# Patient Record
Sex: MALE | URBAN - METROPOLITAN AREA
[De-identification: names, ages, dates, MRNs, and addresses within clinical notes are randomized per-mention and may not be internally consistent; named-entity substitution may affect disease eponyms.]

---

## 2023-07-17 ENCOUNTER — OUT OF OFFICE VISIT (OUTPATIENT)
Dept: URBAN - METROPOLITAN AREA MEDICAL CENTER 27 | Facility: MEDICAL CENTER | Age: 78
End: 2023-07-17
Payer: MEDICARE

## 2023-07-17 DIAGNOSIS — K31.89 ACQUIRED DEFORMITY OF DUODENUM: ICD-10-CM

## 2023-07-17 DIAGNOSIS — Z79.02 ANTIPLATELET OR ANTITHROMBOTIC LONG-TERM USE: ICD-10-CM

## 2023-07-17 DIAGNOSIS — R10.13 ABDOMINAL DISCOMFORT, EPIGASTRIC: ICD-10-CM

## 2023-07-17 PROCEDURE — 99221 1ST HOSP IP/OBS SF/LOW 40: CPT | Performed by: STUDENT IN AN ORGANIZED HEALTH CARE EDUCATION/TRAINING PROGRAM

## 2023-07-17 PROCEDURE — G8427 DOCREV CUR MEDS BY ELIG CLIN: HCPCS | Performed by: STUDENT IN AN ORGANIZED HEALTH CARE EDUCATION/TRAINING PROGRAM

## 2023-07-18 ENCOUNTER — TELEPHONE ENCOUNTER (OUTPATIENT)
Dept: URBAN - METROPOLITAN AREA CLINIC 6 | Facility: CLINIC | Age: 78
End: 2023-07-18

## 2023-08-02 ENCOUNTER — LAB OUTSIDE AN ENCOUNTER (OUTPATIENT)
Dept: URBAN - METROPOLITAN AREA CLINIC 12 | Facility: CLINIC | Age: 78
End: 2023-08-02

## 2023-08-02 ENCOUNTER — DASHBOARD ENCOUNTERS (OUTPATIENT)
Age: 78
End: 2023-08-02

## 2023-08-02 ENCOUNTER — OFFICE VISIT (OUTPATIENT)
Dept: URBAN - METROPOLITAN AREA CLINIC 12 | Facility: CLINIC | Age: 78
End: 2023-08-02
Payer: MEDICARE

## 2023-08-02 VITALS
HEIGHT: 73 IN | HEART RATE: 95 BPM | BODY MASS INDEX: 34.59 KG/M2 | DIASTOLIC BLOOD PRESSURE: 59 MMHG | WEIGHT: 261 LBS | SYSTOLIC BLOOD PRESSURE: 98 MMHG

## 2023-08-02 DIAGNOSIS — Z79.02 LONG TERM (CURRENT) USE OF ANTITHROMBOTICS/ANTIPLATELETS: ICD-10-CM

## 2023-08-02 DIAGNOSIS — R93.5 ABNORMAL CT OF THE ABDOMEN: ICD-10-CM

## 2023-08-02 DIAGNOSIS — R10.13 EPIGASTRIC PAIN: ICD-10-CM

## 2023-08-02 DIAGNOSIS — R63.4 WEIGHT LOSS: ICD-10-CM

## 2023-08-02 PROBLEM — 305058001: Status: ACTIVE | Noted: 2023-08-02

## 2023-08-02 PROCEDURE — 99204 OFFICE O/P NEW MOD 45 MIN: CPT | Performed by: STUDENT IN AN ORGANIZED HEALTH CARE EDUCATION/TRAINING PROGRAM

## 2023-08-02 PROCEDURE — 99244 OFF/OP CNSLTJ NEW/EST MOD 40: CPT | Performed by: STUDENT IN AN ORGANIZED HEALTH CARE EDUCATION/TRAINING PROGRAM

## 2023-08-02 NOTE — HPI-TODAY'S VISIT:
This patient was referred by Dr. Perrin for an evaluation of abdominal pain..  A copy of this will be sent to the referring provider. 76 yo M here for follow up after hospitalization. He has a h/o L renal CA s/p nephrectomy as well as bladder CA, CAD s/p stent placement appx 6-7 years ago, on plavix. Recently admitted to [facility] 2 weeks ago for one day, seen by [facility] while inpatient. Patient opted to have outpatient workup as opposed to inpatient. Chronic Sx of epigastric pain. CT scan from hospitalization reviewed -- showed HH and possible duodenitis. he was started on PPI while inpatient though pt does not recall taking it after discharge.  No prior EGD. Has not had appetite for the past 3-4 weeks. Says he is starting to lose weight. He is not sure how many pounds but says clothes are fitting looser